# Patient Record
Sex: FEMALE | Race: BLACK OR AFRICAN AMERICAN | Employment: OTHER | URBAN - METROPOLITAN AREA
[De-identification: names, ages, dates, MRNs, and addresses within clinical notes are randomized per-mention and may not be internally consistent; named-entity substitution may affect disease eponyms.]

---

## 2019-12-09 ENCOUNTER — HOSPITAL ENCOUNTER (OUTPATIENT)
Dept: VASCULAR SURGERY | Age: 84
Discharge: HOME | End: 2019-12-09
Attending: INTERNAL MEDICINE
Payer: MEDICARE

## 2019-12-09 DIAGNOSIS — N18.30 CHRONIC KIDNEY DISEASE, STAGE III (MODERATE) (HCC): ICD-10-CM

## 2019-12-09 LAB
ABDOMINAL PROX AORTA VEL: 55.2 CM/S
CELIAC PSV: 135.1 CM/S
LEFT KIDNEY LENGTH: 8.46 CM
LEFT KIDNEY WIDTH: 6.06 CM
LEFT RENAL DIST DIAS: 12.4 CM/S
LEFT RENAL DIST RAR: 1.73
LEFT RENAL DIST RI: 0.87
LEFT RENAL DIST SYS: 95.4 CM/S
LEFT RENAL LOWER PARENCHYMA MAX: 53.6 CM/S
LEFT RENAL LOWER PARENCHYMA MIN: 14.3 CM/S
LEFT RENAL LOWER PARENCHYMA RI: 0.73
LEFT RENAL MID DIAS: 18.2 CM/S
LEFT RENAL MID RAR: 3.47
LEFT RENAL MID RI: 0.9
LEFT RENAL MID SYS: 191.3 CM/S
LEFT RENAL MIDDLE PARENCHYMA MAX: 86.9 CM/S
LEFT RENAL MIDDLE PARENCHYMA MIN: 16.7 CM/S
LEFT RENAL MIDDLE PARENCHYMA RI: 0.81
LEFT RENAL UPPER PARENCHYMA MAX: 83 CM/S
LEFT RENAL UPPER PARENCHYMA MIN: 14.3 CM/S
LEFT RENAL UPPER PARENCHYMA RI: 0.83
PROX AORTA LONG DIAM: 2.51 CM
PROX AORTIC AP: 1.76 CM
PROX AORTIC TRANS: 1.94 CM
PROX SMA PSV: 437.9 CM/S

## 2019-12-09 PROCEDURE — 93975 VASCULAR STUDY: CPT

## 2020-01-27 ENCOUNTER — HOSPITAL ENCOUNTER (OUTPATIENT)
Dept: GENERAL RADIOLOGY | Age: 85
Discharge: HOME | End: 2020-01-27
Payer: MEDICARE

## 2020-01-27 DIAGNOSIS — M25.551 PAIN IN RIGHT HIP: ICD-10-CM

## 2020-01-27 PROCEDURE — 73502 X-RAY EXAM HIP UNI 2-3 VIEWS: CPT

## 2021-10-27 ENCOUNTER — TRANSCRIBE ORDER (OUTPATIENT)
Dept: NEUROLOGY | Age: 86
End: 2021-10-27

## 2021-10-27 ENCOUNTER — TELEPHONE (OUTPATIENT)
Dept: RHEUMATOLOGY | Age: 86
End: 2021-10-27

## 2021-10-27 NOTE — TELEPHONE ENCOUNTER
----- Message from [facility] sent at 10/27/2021  3:17 PM EDT -----  Regarding: /Telephone  Appointment not available    Caller's first and last name and relationship to patient (if not the patient):NA      Best contact number:[phone]      Preferred date and time:First avail. Scheduled appointment date and time:NA      Reason for appointment:Back pain       Details to clarify the request:Dr.Heather Tobar at [facility], [place] - RESIDENT DRUG TREATMENT (WOMEN).  [place] referred       [facility]

## 2021-10-27 NOTE — TELEPHONE ENCOUNTER
Return call back to the patient advise we have not received referral from referring provider's office patient stated she was going to contact referring provider's office to have information sent over.  sdh

## 2021-10-28 ENCOUNTER — TELEPHONE (OUTPATIENT)
Dept: RHEUMATOLOGY | Age: 86
End: 2021-10-28

## 2021-10-28 NOTE — TELEPHONE ENCOUNTER
Call patient left voice message for patient to call back into the office to schedule npt appt referring provider Yobani Posey. sdh

## 2021-11-09 ENCOUNTER — TELEPHONE (OUTPATIENT)
Dept: RHEUMATOLOGY | Age: 86
End: 2021-11-09

## 2021-11-09 NOTE — TELEPHONE ENCOUNTER
Return call to patient 2nd attempt left voice message for patient to call back into the office.  sdh

## 2021-11-09 NOTE — TELEPHONE ENCOUNTER
Return call to patient from message that was sent over left voice message for patient to call back into the office.  Sdh